# Patient Record
(demographics unavailable — no encounter records)

---

## 2024-12-04 NOTE — HISTORY OF PRESENT ILLNESS
[de-identified] : 12/4/24: 55yo female (RHD. [place]) presents for RIGHT elbow pain x 1 year. Denies injury at time of this symptom onset, but reports RIGHT elbow injury from a fall many years ago.  Not currently taking any medication for this.  Hx: Gastric ulcer.  [FreeTextEntry5] : ANOOP piedra [RHD] 54 year old female is here today for evaluation of RIGHT elbow pain. reports h/o RIGHT elbow injury after falling years ago, no treatment. new pain onset ~1 year ago w/o new injury. denies radiating symptoms. pain with movement of arm. not taking anything for pain.

## 2024-12-04 NOTE — IMAGING
[de-identified] : RIGHT ELBOW skin intact. no swelling. TTP to lateral epicondyle. elbow ROM: good extension, flexion. good pronation, supination. wrist ROM: good extension, flexion. good digital extension, flex to full fist. palpable radial pulse. + pain with resisted wrist extension.  XRAYS OF RIGHT ELBOW (3 views - PA, OBLIQUE, AND LATERAL VIEWS): no acute displaced fracture or dislocation.

## 2025-04-25 NOTE — PHYSICAL EXAM
[Flexion] : flexion [Extension] : extension [de-identified] : Constitutional; Appears well, no apparent distress Ability to communicate: Normal  Respiratory: non-labored breathing Skin: No rash noted Head: Normocephalic, atraumatic Neck: no visible thyroid enlargement Eyes: Extraocular movements intact Neurologic: Alert and oriented x3 Psychiatric: normal mood, affect and behavior [] : no ecchymosis

## 2025-04-25 NOTE — HISTORY OF PRESENT ILLNESS
[Lower back] : lower back [4] : 4 [Dull/Aching] : dull/aching [Sharp] : sharp [Shooting] : shooting [Intermittent] : intermittent [Household chores] : household chores [Meds] : meds [Standing] : standing [Walking] : walking [FreeTextEntry1] : 4/25/25: Pain is across the lower back radiating into the b/l hips. Not interested in NITHYA at this time but needs Rx for PT.   Initial HPI 06/21/2024: Pain started 2 years ago and recently worsening. Pain is on the BILATERAL lower back described as described as a sharp stabbing pain worse with getting out of bed and lifting her legs.  Saw Dr. Forrest who recommended pain mgt.   MRI Lumbar Spine 6/17/24 independently reviewed: L4-5 spondy L3-4, L4-5 mild central stenosis  Conservative Care: heating pad  Pain Medications: Celebrex PRN;  Past Injections: none Spine surgery: none  Blood thinners: none [] : no [FreeTextEntry7] : b/l legs [de-identified] : l mri

## 2025-04-25 NOTE — DISCUSSION/SUMMARY
[de-identified] : After discussing various treatment options with the patient including but not limited to oral medications, physical therapy, exercise modalities as well as interventional spinal injections, we have decided with the following plan:  - Continue home exercises, stretching, activity modification, physical therapy, and conservative care. - Follow-up as needed. - Will provide prescription for Physical Therapy. - Continue Celebrex PRN. Will refill.

## 2025-04-25 NOTE — HISTORY OF PRESENT ILLNESS
[Lower back] : lower back [4] : 4 [Dull/Aching] : dull/aching [Sharp] : sharp [Shooting] : shooting [Intermittent] : intermittent [Household chores] : household chores [Meds] : meds [Standing] : standing [Walking] : walking [FreeTextEntry1] : 4/25/25: Pain is across the lower back radiating into the b/l hips. Not interested in NITHYA at this time but needs Rx for PT.   Initial HPI 06/21/2024: Pain started 2 years ago and recently worsening. Pain is on the BILATERAL lower back described as described as a sharp stabbing pain worse with getting out of bed and lifting her legs.  Saw Dr. Forrest who recommended pain mgt.   MRI Lumbar Spine 6/17/24 independently reviewed: L4-5 spondy L3-4, L4-5 mild central stenosis  Conservative Care: heating pad  Pain Medications: Celebrex PRN;  Past Injections: none Spine surgery: none  Blood thinners: none [] : no [FreeTextEntry7] : b/l legs [de-identified] : l mri

## 2025-04-25 NOTE — DISCUSSION/SUMMARY
[de-identified] : After discussing various treatment options with the patient including but not limited to oral medications, physical therapy, exercise modalities as well as interventional spinal injections, we have decided with the following plan:  - Continue home exercises, stretching, activity modification, physical therapy, and conservative care. - Follow-up as needed. - Will provide prescription for Physical Therapy. - Continue Celebrex PRN. Will refill.

## 2025-04-25 NOTE — PHYSICAL EXAM
[Flexion] : flexion [Extension] : extension [de-identified] : Constitutional; Appears well, no apparent distress Ability to communicate: Normal  Respiratory: non-labored breathing Skin: No rash noted Head: Normocephalic, atraumatic Neck: no visible thyroid enlargement Eyes: Extraocular movements intact Neurologic: Alert and oriented x3 Psychiatric: normal mood, affect and behavior [] : no ecchymosis